# Patient Record
Sex: MALE | Race: WHITE | NOT HISPANIC OR LATINO | URBAN - METROPOLITAN AREA
[De-identification: names, ages, dates, MRNs, and addresses within clinical notes are randomized per-mention and may not be internally consistent; named-entity substitution may affect disease eponyms.]

---

## 2018-04-14 ENCOUNTER — INPATIENT (INPATIENT)
Facility: HOSPITAL | Age: 43
LOS: 3 days | Discharge: HOME | End: 2018-04-18
Attending: INTERNAL MEDICINE | Admitting: INTERNAL MEDICINE

## 2018-04-14 VITALS
HEART RATE: 63 BPM | DIASTOLIC BLOOD PRESSURE: 69 MMHG | SYSTOLIC BLOOD PRESSURE: 126 MMHG | OXYGEN SATURATION: 100 % | RESPIRATION RATE: 18 BRPM

## 2018-04-14 LAB
ALBUMIN SERPL ELPH-MCNC: 4.8 G/DL — SIGNIFICANT CHANGE UP (ref 3.5–5.2)
ALP SERPL-CCNC: 206 U/L — HIGH (ref 30–115)
ALT FLD-CCNC: 93 U/L — HIGH (ref 0–41)
ANION GAP SERPL CALC-SCNC: 28 MMOL/L — HIGH (ref 7–14)
APTT BLD: 33.7 SEC — SIGNIFICANT CHANGE UP (ref 27–39.2)
AST SERPL-CCNC: 98 U/L — HIGH (ref 0–41)
BASE EXCESS BLDA CALC-SCNC: -2.4 MMOL/L — LOW (ref -2–2)
BILIRUB DIRECT SERPL-MCNC: <0.2 MG/DL — SIGNIFICANT CHANGE UP (ref 0–0.2)
BILIRUB INDIRECT FLD-MCNC: >0.2 MG/DL — SIGNIFICANT CHANGE UP (ref 0.2–1.2)
BILIRUB SERPL-MCNC: 0.4 MG/DL — SIGNIFICANT CHANGE UP (ref 0.2–1.2)
BILIRUB SERPL-MCNC: 0.4 MG/DL — SIGNIFICANT CHANGE UP (ref 0.2–1.2)
BLD GP AB SCN SERPL QL: SIGNIFICANT CHANGE UP
BUN SERPL-MCNC: 14 MG/DL — SIGNIFICANT CHANGE UP (ref 10–20)
CALCIUM SERPL-MCNC: 10.9 MG/DL — HIGH (ref 8.5–10.1)
CHLORIDE SERPL-SCNC: 97 MMOL/L — LOW (ref 98–110)
CK SERPL-CCNC: 154 U/L — SIGNIFICANT CHANGE UP (ref 0–225)
CO2 SERPL-SCNC: 13 MMOL/L — LOW (ref 17–32)
CREAT SERPL-MCNC: 1.6 MG/DL — HIGH (ref 0.7–1.5)
GAS PNL BLDV: SIGNIFICANT CHANGE UP
GLUCOSE SERPL-MCNC: 100 MG/DL — HIGH (ref 70–99)
HCO3 BLDA-SCNC: 21 MMOL/L — LOW (ref 23–27)
HCT VFR BLD CALC: 37.7 % — LOW (ref 42–52)
HGB BLD-MCNC: 13 G/DL — LOW (ref 14–18)
INR BLD: 1.22 RATIO — SIGNIFICANT CHANGE UP (ref 0.65–1.3)
LACTATE SERPL-SCNC: 4.1 MMOL/L — CRITICAL HIGH (ref 0.5–2.2)
LIDOCAIN IGE QN: 129 U/L — HIGH (ref 7–60)
MCHC RBC-ENTMCNC: 30.6 PG — SIGNIFICANT CHANGE UP (ref 27–31)
MCHC RBC-ENTMCNC: 34.5 G/DL — SIGNIFICANT CHANGE UP (ref 32–37)
MCV RBC AUTO: 88.7 FL — SIGNIFICANT CHANGE UP (ref 80–94)
NRBC # BLD: 0 /100 WBCS — SIGNIFICANT CHANGE UP (ref 0–0)
PCO2 BLDA: 32 MMHG — LOW (ref 38–42)
PH BLDA: 7.43 — HIGH (ref 7.38–7.42)
PLATELET # BLD AUTO: 271 K/UL — SIGNIFICANT CHANGE UP (ref 130–400)
PO2 BLDA: 204 MMHG — HIGH (ref 78–95)
POTASSIUM SERPL-MCNC: 5.6 MMOL/L — HIGH (ref 3.5–5)
POTASSIUM SERPL-SCNC: 5.6 MMOL/L — HIGH (ref 3.5–5)
PROT SERPL-MCNC: 8.4 G/DL — HIGH (ref 6–8)
PROTHROM AB SERPL-ACNC: 13.2 SEC — HIGH (ref 9.95–12.87)
RBC # BLD: 4.25 M/UL — LOW (ref 4.7–6.1)
RBC # FLD: 12.4 % — SIGNIFICANT CHANGE UP (ref 11.5–14.5)
SAO2 % BLDA: 100 % — HIGH (ref 94–98)
SODIUM SERPL-SCNC: 138 MMOL/L — SIGNIFICANT CHANGE UP (ref 135–146)
TROPONIN T SERPL-MCNC: <0.01 NG/ML — SIGNIFICANT CHANGE UP
TYPE + AB SCN PNL BLD: SIGNIFICANT CHANGE UP
WBC # BLD: 17.21 K/UL — HIGH (ref 4.8–10.8)
WBC # FLD AUTO: 17.21 K/UL — HIGH (ref 4.8–10.8)

## 2018-04-14 RX ORDER — SODIUM CHLORIDE 9 MG/ML
1000 INJECTION INTRAMUSCULAR; INTRAVENOUS; SUBCUTANEOUS ONCE
Qty: 0 | Refills: 0 | Status: COMPLETED | OUTPATIENT
Start: 2018-04-14 | End: 2018-04-14

## 2018-04-14 RX ORDER — ETOMIDATE 2 MG/ML
20 INJECTION INTRAVENOUS ONCE
Qty: 0 | Refills: 0 | Status: COMPLETED | OUTPATIENT
Start: 2018-04-14 | End: 2018-04-14

## 2018-04-14 RX ORDER — ETOMIDATE 2 MG/ML
10 INJECTION INTRAVENOUS ONCE
Qty: 0 | Refills: 0 | Status: COMPLETED | OUTPATIENT
Start: 2018-04-14 | End: 2018-04-14

## 2018-04-14 RX ORDER — METOCLOPRAMIDE HCL 10 MG
10 TABLET ORAL ONCE
Qty: 0 | Refills: 0 | Status: COMPLETED | OUTPATIENT
Start: 2018-04-14 | End: 2018-04-14

## 2018-04-14 RX ORDER — SUCCINYLCHOLINE CHLORIDE 100 MG/5ML
100 SYRINGE (ML) INTRAVENOUS ONCE
Qty: 0 | Refills: 0 | Status: COMPLETED | OUTPATIENT
Start: 2018-04-14 | End: 2018-04-14

## 2018-04-14 RX ORDER — ATROPINE SULFATE 0.1 MG/ML
1 SYRINGE (ML) INJECTION ONCE
Qty: 0 | Refills: 0 | Status: COMPLETED | OUTPATIENT
Start: 2018-04-14 | End: 2018-04-14

## 2018-04-14 RX ADMIN — Medication 100 MILLIGRAM(S): at 19:25

## 2018-04-14 RX ADMIN — Medication 100 MILLIGRAM(S): at 19:40

## 2018-04-14 RX ADMIN — Medication 10 MILLIGRAM(S): at 19:06

## 2018-04-14 RX ADMIN — Medication 1 MILLIGRAM(S): at 19:30

## 2018-04-14 RX ADMIN — ETOMIDATE 20 MILLIGRAM(S): 2 INJECTION INTRAVENOUS at 19:25

## 2018-04-14 RX ADMIN — SODIUM CHLORIDE 1000 MILLILITER(S): 9 INJECTION INTRAMUSCULAR; INTRAVENOUS; SUBCUTANEOUS at 19:20

## 2018-04-14 RX ADMIN — ETOMIDATE 10 MILLIGRAM(S): 2 INJECTION INTRAVENOUS at 19:40

## 2018-04-14 NOTE — ED PROVIDER NOTE - OBJECTIVE STATEMENT
41 y/o M w/ pmh significant for TBI, chronic pain, anxiety, s/p abdominal surgery as an infant p/w n/v/d and AMS. Per friends at bedside pt was acting normally and playing video games when he began complaining of feeling cold, pt became diaphoretic and altered and began vomiting and having diarrhea. Pt takes lyrica, xanax, and percocet. Received narcan in the field with no improvement. Per friends pt has had similar reactions in past but never this severe. 41 y/o M w/ pmh significant for TBI, chronic pain, anxiety, s/p abdominal surgery as an infant p/w n/v/d and AMS. Per friends at bedside pt was acting normally and playing video games when he began complaining of feeling cold, pt became diaphoretic and altered and began vomiting and having diarrhea. Pt takes lyrica, xanax, and percocet. Received narcan in the field with no improvement. Per friends pt has had similar episodes in past but never this severe.

## 2018-04-14 NOTE — ED PROVIDER NOTE - ATTENDING CONTRIBUTION TO CARE
I personally evaluated the patient. I reviewed the Resident’s or Physician Assistant’s note (as assigned above), and agree with the findings and plan except as documented in my note.  pt presents with ams, n/v/d, and intermittent bradycardia. pt intubated for airway protection, and ams. ng placed. labs, ekg, cxr, ct head, ct ab and admit.

## 2018-04-14 NOTE — ED PROVIDER NOTE - PHYSICAL EXAMINATION
Minimally responsive, moaning to painful stimulus, ill appearing and in moderate distress. Vomiting intermittently but protecting airway. Skin  warm and dry, no acute rash. Head normocephalic, atraumatic. PERRLA, conjunctiva and sclera clear. MM moist, no nasal discharge.  Neck supple nt, no meningeal signs. Heart RRR s1s2 nl, no rub/murmur. Lungs- No retractions, BS equal, CTAB. Abdomen soft ntnd no r/g. Extremities- moves all weakly, +equal distal pulses, brisk cap refill. No LE edema.

## 2018-04-15 DIAGNOSIS — Z98.890 OTHER SPECIFIED POSTPROCEDURAL STATES: Chronic | ICD-10-CM

## 2018-04-15 LAB
ALBUMIN SERPL ELPH-MCNC: 4.5 G/DL — SIGNIFICANT CHANGE UP (ref 3.5–5.2)
ALP SERPL-CCNC: 176 U/L — HIGH (ref 30–115)
ALT FLD-CCNC: 69 U/L — HIGH (ref 0–41)
ANION GAP SERPL CALC-SCNC: 16 MMOL/L — HIGH (ref 7–14)
APAP SERPL-MCNC: <5 UG/ML — LOW (ref 10–30)
APPEARANCE UR: CLEAR — SIGNIFICANT CHANGE UP
APTT BLD: 38.4 SEC — SIGNIFICANT CHANGE UP (ref 27–39.2)
AST SERPL-CCNC: 43 U/L — HIGH (ref 0–41)
BASE EXCESS BLDA CALC-SCNC: 0.7 MMOL/L — SIGNIFICANT CHANGE UP (ref -2–2)
BASOPHILS # BLD AUTO: 0.03 K/UL — SIGNIFICANT CHANGE UP (ref 0–0.2)
BASOPHILS NFR BLD AUTO: 0.3 % — SIGNIFICANT CHANGE UP (ref 0–1)
BILIRUB DIRECT SERPL-MCNC: <0.2 MG/DL — SIGNIFICANT CHANGE UP (ref 0–0.2)
BILIRUB INDIRECT FLD-MCNC: >0.1 MG/DL — LOW (ref 0.2–1.2)
BILIRUB SERPL-MCNC: 0.3 MG/DL — SIGNIFICANT CHANGE UP (ref 0.2–1.2)
BILIRUB UR-MCNC: NEGATIVE — SIGNIFICANT CHANGE UP
BUN SERPL-MCNC: 12 MG/DL — SIGNIFICANT CHANGE UP (ref 10–20)
CALCIUM SERPL-MCNC: 9.3 MG/DL — SIGNIFICANT CHANGE UP (ref 8.5–10.1)
CHLORIDE SERPL-SCNC: 98 MMOL/L — SIGNIFICANT CHANGE UP (ref 98–110)
CK SERPL-CCNC: 133 U/L — SIGNIFICANT CHANGE UP (ref 0–225)
CO2 SERPL-SCNC: 25 MMOL/L — SIGNIFICANT CHANGE UP (ref 17–32)
COLOR SPEC: YELLOW — SIGNIFICANT CHANGE UP
COMMENT - URINE: SIGNIFICANT CHANGE UP
CREAT SERPL-MCNC: 1.2 MG/DL — SIGNIFICANT CHANGE UP (ref 0.7–1.5)
DIFF PNL FLD: NEGATIVE — SIGNIFICANT CHANGE UP
EOSINOPHIL # BLD AUTO: 0.34 K/UL — SIGNIFICANT CHANGE UP (ref 0–0.7)
EOSINOPHIL NFR BLD AUTO: 3 % — SIGNIFICANT CHANGE UP (ref 0–8)
EPI CELLS # UR: (no result) /HPF
FLU A RESULT: NEGATIVE — SIGNIFICANT CHANGE UP
FLU A RESULT: NEGATIVE — SIGNIFICANT CHANGE UP
FLUAV AG NPH QL: NEGATIVE — SIGNIFICANT CHANGE UP
FLUBV AG NPH QL: NEGATIVE — SIGNIFICANT CHANGE UP
GLUCOSE SERPL-MCNC: 99 MG/DL — SIGNIFICANT CHANGE UP (ref 70–99)
GLUCOSE UR QL: NEGATIVE — SIGNIFICANT CHANGE UP
HCO3 BLDA-SCNC: 24 MMOL/L — SIGNIFICANT CHANGE UP (ref 23–27)
HCT VFR BLD CALC: 40.7 % — LOW (ref 42–52)
HGB BLD-MCNC: 13.8 G/DL — LOW (ref 14–18)
IMM GRANULOCYTES NFR BLD AUTO: 0.4 % — HIGH (ref 0.1–0.3)
KETONES UR-MCNC: NEGATIVE — SIGNIFICANT CHANGE UP
LEUKOCYTE ESTERASE UR-ACNC: NEGATIVE — SIGNIFICANT CHANGE UP
LYMPHOCYTES # BLD AUTO: 18.7 % — LOW (ref 20.5–51.1)
LYMPHOCYTES # BLD AUTO: 2.11 K/UL — SIGNIFICANT CHANGE UP (ref 1.2–3.4)
MAGNESIUM SERPL-MCNC: 2 MG/DL — SIGNIFICANT CHANGE UP (ref 1.8–2.4)
MCHC RBC-ENTMCNC: 30.2 PG — SIGNIFICANT CHANGE UP (ref 27–31)
MCHC RBC-ENTMCNC: 33.9 G/DL — SIGNIFICANT CHANGE UP (ref 32–37)
MCV RBC AUTO: 89.1 FL — SIGNIFICANT CHANGE UP (ref 80–94)
MONOCYTES # BLD AUTO: 0.7 K/UL — HIGH (ref 0.1–0.6)
MONOCYTES NFR BLD AUTO: 6.2 % — SIGNIFICANT CHANGE UP (ref 1.7–9.3)
NEUTROPHILS # BLD AUTO: 8.04 K/UL — HIGH (ref 1.4–6.5)
NEUTROPHILS NFR BLD AUTO: 71.4 % — SIGNIFICANT CHANGE UP (ref 42.2–75.2)
NITRITE UR-MCNC: NEGATIVE — SIGNIFICANT CHANGE UP
NT-PROBNP SERPL-SCNC: 113 PG/ML — SIGNIFICANT CHANGE UP (ref 0–300)
OSMOLALITY SERPL: 284 MOS/KG — LOW (ref 289–308)
PCO2 BLDA: 32 MMHG — LOW (ref 38–42)
PH BLDA: 7.47 — HIGH (ref 7.38–7.42)
PH UR: 7 — SIGNIFICANT CHANGE UP (ref 5–8)
PLATELET # BLD AUTO: 285 K/UL — SIGNIFICANT CHANGE UP (ref 130–400)
PO2 BLDA: 239 MMHG — HIGH (ref 78–95)
POTASSIUM SERPL-MCNC: 4 MMOL/L — SIGNIFICANT CHANGE UP (ref 3.5–5)
POTASSIUM SERPL-SCNC: 4 MMOL/L — SIGNIFICANT CHANGE UP (ref 3.5–5)
PROT SERPL-MCNC: 6.8 G/DL — SIGNIFICANT CHANGE UP (ref 6–8)
PROT UR-MCNC: 30
RBC # BLD: 4.57 M/UL — LOW (ref 4.7–6.1)
RBC # FLD: 12.5 % — SIGNIFICANT CHANGE UP (ref 11.5–14.5)
SALICYLATES SERPL-MCNC: <0.3 MG/DL — LOW (ref 4–30)
SAO2 % BLDA: 100 % — HIGH (ref 94–98)
SODIUM SERPL-SCNC: 139 MMOL/L — SIGNIFICANT CHANGE UP (ref 135–146)
SP GR SPEC: >=1.03 — SIGNIFICANT CHANGE UP (ref 1.01–1.03)
UROBILINOGEN FLD QL: 0.2 — SIGNIFICANT CHANGE UP (ref 0.2–0.2)
WBC # BLD: 11.27 K/UL — HIGH (ref 4.8–10.8)
WBC # FLD AUTO: 11.27 K/UL — HIGH (ref 4.8–10.8)

## 2018-04-15 RX ORDER — FENTANYL CITRATE 50 UG/ML
1 INJECTION INTRAVENOUS
Qty: 2500 | Refills: 0 | Status: DISCONTINUED | OUTPATIENT
Start: 2018-04-15 | End: 2018-04-16

## 2018-04-15 RX ORDER — SODIUM CHLORIDE 9 MG/ML
1000 INJECTION, SOLUTION INTRAVENOUS
Qty: 0 | Refills: 0 | Status: DISCONTINUED | OUTPATIENT
Start: 2018-04-15 | End: 2018-04-17

## 2018-04-15 RX ORDER — SODIUM CHLORIDE 9 MG/ML
1000 INJECTION INTRAMUSCULAR; INTRAVENOUS; SUBCUTANEOUS
Qty: 0 | Refills: 0 | Status: DISCONTINUED | OUTPATIENT
Start: 2018-04-15 | End: 2018-04-15

## 2018-04-15 RX ORDER — ONDANSETRON 8 MG/1
4 TABLET, FILM COATED ORAL ONCE
Qty: 0 | Refills: 0 | Status: COMPLETED | OUTPATIENT
Start: 2018-04-15 | End: 2018-04-15

## 2018-04-15 RX ORDER — PANTOPRAZOLE SODIUM 20 MG/1
40 TABLET, DELAYED RELEASE ORAL
Qty: 0 | Refills: 0 | Status: DISCONTINUED | OUTPATIENT
Start: 2018-04-15 | End: 2018-04-17

## 2018-04-15 RX ORDER — HEPARIN SODIUM 5000 [USP'U]/ML
5000 INJECTION INTRAVENOUS; SUBCUTANEOUS EVERY 8 HOURS
Qty: 0 | Refills: 0 | Status: DISCONTINUED | OUTPATIENT
Start: 2018-04-15 | End: 2018-04-18

## 2018-04-15 RX ORDER — PROPOFOL 10 MG/ML
1 INJECTION, EMULSION INTRAVENOUS
Qty: 1000 | Refills: 0 | Status: DISCONTINUED | OUTPATIENT
Start: 2018-04-15 | End: 2018-04-16

## 2018-04-15 RX ORDER — ALPRAZOLAM 0.25 MG
0 TABLET ORAL
Qty: 0 | Refills: 0 | COMMUNITY

## 2018-04-15 RX ORDER — CHLORHEXIDINE GLUCONATE 213 G/1000ML
15 SOLUTION TOPICAL
Qty: 0 | Refills: 0 | Status: DISCONTINUED | OUTPATIENT
Start: 2018-04-15 | End: 2018-04-16

## 2018-04-15 RX ORDER — MIDAZOLAM HYDROCHLORIDE 1 MG/ML
0.3 INJECTION, SOLUTION INTRAMUSCULAR; INTRAVENOUS
Qty: 100 | Refills: 0 | Status: DISCONTINUED | OUTPATIENT
Start: 2018-04-15 | End: 2018-04-16

## 2018-04-15 RX ORDER — PIPERACILLIN AND TAZOBACTAM 4; .5 G/20ML; G/20ML
3.38 INJECTION, POWDER, LYOPHILIZED, FOR SOLUTION INTRAVENOUS EVERY 8 HOURS
Qty: 0 | Refills: 0 | Status: DISCONTINUED | OUTPATIENT
Start: 2018-04-15 | End: 2018-04-15

## 2018-04-15 RX ADMIN — CHLORHEXIDINE GLUCONATE 15 MILLILITER(S): 213 SOLUTION TOPICAL at 05:40

## 2018-04-15 RX ADMIN — MIDAZOLAM HYDROCHLORIDE 0.3 MG/HR: 1 INJECTION, SOLUTION INTRAMUSCULAR; INTRAVENOUS at 08:01

## 2018-04-15 RX ADMIN — MIDAZOLAM HYDROCHLORIDE 0.3 MG/HR: 1 INJECTION, SOLUTION INTRAMUSCULAR; INTRAVENOUS at 00:33

## 2018-04-15 RX ADMIN — PROPOFOL 0.45 MICROGRAM(S)/KG/MIN: 10 INJECTION, EMULSION INTRAVENOUS at 00:33

## 2018-04-15 RX ADMIN — PIPERACILLIN AND TAZOBACTAM 25 GRAM(S): 4; .5 INJECTION, POWDER, LYOPHILIZED, FOR SOLUTION INTRAVENOUS at 05:40

## 2018-04-15 RX ADMIN — ONDANSETRON 4 MILLIGRAM(S): 8 TABLET, FILM COATED ORAL at 23:10

## 2018-04-15 RX ADMIN — HEPARIN SODIUM 5000 UNIT(S): 5000 INJECTION INTRAVENOUS; SUBCUTANEOUS at 21:10

## 2018-04-15 RX ADMIN — PROPOFOL 0.45 MICROGRAM(S)/KG/MIN: 10 INJECTION, EMULSION INTRAVENOUS at 08:08

## 2018-04-15 RX ADMIN — HEPARIN SODIUM 5000 UNIT(S): 5000 INJECTION INTRAVENOUS; SUBCUTANEOUS at 05:40

## 2018-04-15 RX ADMIN — HEPARIN SODIUM 5000 UNIT(S): 5000 INJECTION INTRAVENOUS; SUBCUTANEOUS at 13:08

## 2018-04-15 RX ADMIN — FENTANYL CITRATE 7.5 MICROGRAM(S)/KG/HR: 50 INJECTION INTRAVENOUS at 08:00

## 2018-04-15 RX ADMIN — FENTANYL CITRATE 7.5 MICROGRAM(S)/KG/HR: 50 INJECTION INTRAVENOUS at 00:32

## 2018-04-15 RX ADMIN — SODIUM CHLORIDE 100 MILLILITER(S): 9 INJECTION, SOLUTION INTRAVENOUS at 11:54

## 2018-04-15 RX ADMIN — SODIUM CHLORIDE 100 MILLILITER(S): 9 INJECTION INTRAMUSCULAR; INTRAVENOUS; SUBCUTANEOUS at 07:58

## 2018-04-15 NOTE — CONSULT NOTE ADULT - SUBJECTIVE AND OBJECTIVE BOX
Patient is a 42y old  Male who presents with a chief complaint of confusion (15 Apr 2018 02:09)      HPI:  42 year old man with history of traumatic brain injury, anxiety, chronic pain brought by ems after his friends called because of his confusion. He was apparently playing video games at his friends house when all of a sudden he started having chills and vomiting and having stool incontinence on himself and then he became confused. His friends denied drug ingestion. He received narcan on the field but he was still confused. The patient was confused upon arrival to the ED. He was still vomiting in the ED. He was agitated so they had to perform endotracheal intubation. (15 Apr 2018 02:09)      PAST MEDICAL & SURGICAL HISTORY:  Anxiety  Chronic pain syndrome  Traumatic brain injury, without loss of consciousness, subsequent encounter  H/O abdominal surgery      SOCIAL HX:   Smoking      UTO                  ETOH                            Other    FAMILY HISTORY:  No pertinent family history in first degree relatives      ROS:  See HPI     Allergies    Allergy Status Unknown    Intolerances          PHYSICAL EXAM    ICU Vital Signs Last 24 Hrs  T(C): 38 (15 Apr 2018 08:00), Max: 38 (15 Apr 2018 08:00)  T(F): 100.4 (15 Apr 2018 08:00), Max: 100.4 (15 Apr 2018 08:00)  HR: 58 (15 Apr 2018 08:00) (50 - 98)  BP: 158/74 (15 Apr 2018 08:00) (100/81 - 194/111)  BP(mean): 104 (15 Apr 2018 08:00) (90 - 112)  ABP: --  ABP(mean): --  RR: 26 (15 Apr 2018 08:00) (18 - 45)  SpO2: 100% (15 Apr 2018 08:00) (96% - 100%)      General: Sedation vacation.  Follows commands  HEENT:  + ET              Lymph Nodes: No cervical LN   Lungs: Bilateral BS  Cardiovascular: Regular  Abdomen: Soft, Positive BS  Extremities: No clubbing  Skin: Warm  Neurological: Non focal       04-14-18 @ 07:01  -  04-15-18 @ 07:00  --------------------------------------------------------  IN:    fentaNYL  Infusion: 239.4 mL    midazolam Infusion: 40 mL    propofol Infusion: 118.7 mL    sodium chloride 0.9%.: 400 mL  Total IN: 798 mL    OUT:    Indwelling Catheter - Urethral: 455 mL    Nasoenteral Tube: 100 mL  Total OUT: 555 mL    Total NET: 243 mL      04-15-18 @ 07:01  -  04-15-18 @ 08:51  --------------------------------------------------------  IN:    fentaNYL  Infusion: 41.6 mL    midazolam Infusion: 4 mL    sodium chloride 0.9%.: 100 mL  Total IN: 145.6 mL    OUT:    Indwelling Catheter - Urethral: 75 mL  Total OUT: 75 mL    Total NET: 70.6 mL          LABS:                          13.8   11.27 )-----------( 285      ( 15 Apr 2018 04:50 )             40.7                                               04-15    139  |  98  |  12  ----------------------------<  99  4.0   |  25  |  1.2    Ca    9.3      15 Apr 2018 04:50  Mg     2.0     04-15    TPro  6.8  /  Alb  4.5  /  TBili  0.3  /  DBili  <0.2  /  AST  43<H>  /  ALT  69<H>  /  AlkPhos  176<H>  04-15      PT/INR - ( 14 Apr 2018 18:58 )   PT: 13.20 sec;   INR: 1.22 ratio         PTT - ( 15 Apr 2018 04:50 )  PTT:38.4 sec                                       Urinalysis Basic - ( 14 Apr 2018 23:50 )    Color: Yellow / Appearance: Clear / SG: >=1.030 / pH: x  Gluc: x / Ketone: Negative  / Bili: Negative / Urobili: 0.2   Blood: x / Protein: 30 / Nitrite: Negative   Leuk Esterase: Negative / RBC: x / WBC x   Sq Epi: x / Non Sq Epi: Few /HPF / Bacteria: x        CARDIAC MARKERS ( 15 Apr 2018 04:50 )  x     / x     / 133 U/L / x     / x      CARDIAC MARKERS ( 14 Apr 2018 18:58 )  x     / <0.01 ng/mL / 154 U/L / x     / x                                                LIVER FUNCTIONS - ( 15 Apr 2018 04:50 )  Alb: 4.5 g/dL / Pro: 6.8 g/dL / ALK PHOS: 176 U/L / ALT: 69 U/L / AST: 43 U/L / GGT: x                                                                                               Mode: AC/ CMV (Assist Control/ Continuous Mandatory Ventilation)  RR (machine): 16  TV (machine): 450  FiO2: 60  PEEP: 5  ITime: 1  MAP: 13  PIP: 27                                      ABG - ( 15 Apr 2018 07:22 )  pH: 7.47  /  pCO2: 32    /  pO2: 239   / HCO3: 24    / Base Excess: 0.7   /  SaO2: 100                 X-Rays        ET, OG OK.  No infiltrate                                                                             ECHO    MEDICATIONS  (STANDING):  chlorhexidine 0.12% Liquid 15 milliLiter(s) Swish and Spit two times a day  fentaNYL   Infusion 1 MICROgram(s)/kG/Hr (7.5 mL/Hr) IV Continuous <Continuous>  heparin  Injectable 5000 Unit(s) SubCutaneous every 8 hours  midazolam Infusion 0.3 mG/Hr (0.3 mL/Hr) IV Continuous <Continuous>  pantoprazole   Suspension 40 milliGRAM(s) Oral before breakfast  piperacillin/tazobactam IVPB. 3.375 Gram(s) IV Intermittent every 8 hours  propofol Infusion 1 MICROgram(s)/kG/Min (0.45 mL/Hr) IV Continuous <Continuous>  sodium chloride 0.9%. 1000 milliLiter(s) (100 mL/Hr) IV Continuous <Continuous>    MEDICATIONS  (PRN):

## 2018-04-15 NOTE — H&P ADULT - ASSESSMENT
42 year old man with history of TBI , chronic pain on percocet , anxiety brought by EMS for vomiting , diarrhea and confusion . Intubated for airway protection. High likelihood of aspiration. CT head negative for acute intracranial pathology. CT abdomen consistent with enteritis.     #Acute confusion : could be secondary to viral enteritis versus toxin ingestion. Intubated for airway protection. Tylenol, salicylate levels are within normal.   Toxicology consulted follow up official recs  Drug screen  Consider lumbar puncture   EEG   consider neurology consult     #Transaminitis : could be due to acute viral infection versus transient hypotension or toxin ingestion . CT abdomen negative for acute intrahepatic process.   Will monitor trend of liver enzymes   viral hepatitis serologies     #Diarrhea. vomiting : CT showing enteritis with liquid stools . Could be withdrawing from toxin versus infectious   Sent blood cultures and urine analysis.   Zocyn    #Intubated: for airway protection . There is a chance he may have aspirated given that he was vomiting prior to intubation.   Follow xrays   HOB elevation to 45  Sedation with midazolam and fentanyl to reach RAAS -2     #MARISA likely from dehydration . No indication for RRT   Get urine for FENA   Accurate in and out   NS at 100 cc/hr     #Dispo to be determined   Full code   DVT prophylaxis   GI prophylaxis

## 2018-04-15 NOTE — H&P ADULT - HISTORY OF PRESENT ILLNESS
42 year old man with history of traumatic brain injury, anxiety, chronic pain brought by ems after his friends called because of his confusion. He was apparently playing video games at his friends house when all of a sudden he started having chills and vomiting and having stool incontinence on himself and then he became confused. His friends denied drug ingestion. He received narcan on the field but he was still confused. The patient was confused upon arrival to the ED. He was still vomiting in the ED. He was agitated so they had to perform endotracheal intubation.

## 2018-04-15 NOTE — ED ADULT TRIAGE NOTE - CHIEF COMPLAINT QUOTE
Pt was brought by EMS for altered mental status, vomiting and syncope at home. Narcan was given by EMS prior to arrival without improvement.

## 2018-04-15 NOTE — H&P ADULT - NSHPPHYSICALEXAM_GEN_ALL_CORE
VITAL SIGNS: I have reviewed nursing notes and confirm.  CONSTITUTIONAL: Well-developed; well-nourished; intubated   SKIN: Skin exam is warm and dry, no acute rash.  HEAD: Normocephalic; atraumatic. Excessive oral secretions   EYES: PERRL, non icteric   NECK: Supple; non tender.  No lymphadenopathy.  CARD: S1, S2 normal; no murmurs, gallops, or rubs. Regular rate and rhythm.  RESP: No wheezes, rales or rhonchi.  ABD: Normal bowel sounds; soft; non-distended; no hepatosplenomegaly.  EXT: Normal ROM. No clubbing, cyanosis or edema.  NEURO: sedated

## 2018-04-15 NOTE — H&P ADULT - NSHPLABSRESULTS_GEN_ALL_CORE
13.0   17.21 )-----------( 271      ( 14 Apr 2018 21:00 )             37.7     04-14    138  |  97<L>  |  14  ----------------------------<  100<H>  5.6<H>   |  13<L>  |  1.6<H>    Ca    10.9<H>      14 Apr 2018 18:58    TPro  8.4<H>  /  Alb  4.8  /  TBili  0.4  /  DBili  <0.2  /  AST  98<H>  /  ALT  93<H>  /  AlkPhos  206<H>  04-14          ABG - ( 14 Apr 2018 21:39 )  pH: 7.43  /  pCO2: 32    /  pO2: 204   / HCO3: 21    / Base Excess: -2.4  /  SaO2: 100       Urinalysis Basic - ( 14 Apr 2018 23:50 )    Color: Yellow / Appearance: Clear / SG: >=1.030 / pH: x  Gluc: x / Ketone: Negative  / Bili: Negative / Urobili: 0.2   Blood: x / Protein: 30 / Nitrite: Negative   Leuk Esterase: Negative / RBC: x / WBC x   Sq Epi: x / Non Sq Epi: Few /HPF / Bacteria: x        PT/INR - ( 14 Apr 2018 18:58 )   PT: 13.20 sec;   INR: 1.22 ratio         PTT - ( 14 Apr 2018 18:58 )  PTT:33.7 sec    Lactate Trend  04-14 @ 18:58 Lactate:4.1   Repeat lactic 0.8      CARDIAC MARKERS ( 14 Apr 2018 18:58 )  x     / <0.01 ng/mL / 154 U/L / x     / x

## 2018-04-15 NOTE — H&P ADULT - PMH
Anxiety    Chronic pain syndrome    Traumatic brain injury, without loss of consciousness, subsequent encounter

## 2018-04-16 LAB
ALBUMIN SERPL ELPH-MCNC: 4.3 G/DL — SIGNIFICANT CHANGE UP (ref 3.5–5.2)
ALP SERPL-CCNC: 157 U/L — HIGH (ref 30–115)
ALT FLD-CCNC: 45 U/L — HIGH (ref 0–41)
ANION GAP SERPL CALC-SCNC: 21 MMOL/L — HIGH (ref 7–14)
APTT BLD: 34.5 SEC — SIGNIFICANT CHANGE UP (ref 27–39.2)
AST SERPL-CCNC: 22 U/L — SIGNIFICANT CHANGE UP (ref 0–41)
BASOPHILS # BLD AUTO: 0.01 K/UL — SIGNIFICANT CHANGE UP (ref 0–0.2)
BASOPHILS NFR BLD AUTO: 0.1 % — SIGNIFICANT CHANGE UP (ref 0–1)
BILIRUB SERPL-MCNC: 0.4 MG/DL — SIGNIFICANT CHANGE UP (ref 0.2–1.2)
BUN SERPL-MCNC: 12 MG/DL — SIGNIFICANT CHANGE UP (ref 10–20)
CALCIUM SERPL-MCNC: 9 MG/DL — SIGNIFICANT CHANGE UP (ref 8.5–10.1)
CHLORIDE SERPL-SCNC: 103 MMOL/L — SIGNIFICANT CHANGE UP (ref 98–110)
CHLORIDE UR-SCNC: 20 — SIGNIFICANT CHANGE UP
CO2 SERPL-SCNC: 24 MMOL/L — SIGNIFICANT CHANGE UP (ref 17–32)
CREAT ?TM UR-MCNC: 94 MG/DL — SIGNIFICANT CHANGE UP
CREAT SERPL-MCNC: 1 MG/DL — SIGNIFICANT CHANGE UP (ref 0.7–1.5)
CULTURE RESULTS: NO GROWTH — SIGNIFICANT CHANGE UP
DRUG SCREEN 1, URINE RESULT: SIGNIFICANT CHANGE UP
EOSINOPHIL # BLD AUTO: 0 K/UL — SIGNIFICANT CHANGE UP (ref 0–0.7)
EOSINOPHIL NFR BLD AUTO: 0 % — SIGNIFICANT CHANGE UP (ref 0–8)
GLUCOSE SERPL-MCNC: 118 MG/DL — HIGH (ref 70–99)
HCT VFR BLD CALC: 38.3 % — LOW (ref 42–52)
HGB BLD-MCNC: 13.2 G/DL — LOW (ref 14–18)
IMM GRANULOCYTES NFR BLD AUTO: 0.4 % — HIGH (ref 0.1–0.3)
INR BLD: 1.13 RATIO — SIGNIFICANT CHANGE UP (ref 0.65–1.3)
LYMPHOCYTES # BLD AUTO: 1.63 K/UL — SIGNIFICANT CHANGE UP (ref 1.2–3.4)
LYMPHOCYTES # BLD AUTO: 13.1 % — LOW (ref 20.5–51.1)
MAGNESIUM SERPL-MCNC: 1.8 MG/DL — SIGNIFICANT CHANGE UP (ref 1.8–2.4)
MCHC RBC-ENTMCNC: 30.4 PG — SIGNIFICANT CHANGE UP (ref 27–31)
MCHC RBC-ENTMCNC: 34.5 G/DL — SIGNIFICANT CHANGE UP (ref 32–37)
MCV RBC AUTO: 88.2 FL — SIGNIFICANT CHANGE UP (ref 80–94)
MONOCYTES # BLD AUTO: 0.71 K/UL — HIGH (ref 0.1–0.6)
MONOCYTES NFR BLD AUTO: 5.7 % — SIGNIFICANT CHANGE UP (ref 1.7–9.3)
NEUTROPHILS # BLD AUTO: 10.05 K/UL — HIGH (ref 1.4–6.5)
NEUTROPHILS NFR BLD AUTO: 80.7 % — HIGH (ref 42.2–75.2)
PLATELET # BLD AUTO: 301 K/UL — SIGNIFICANT CHANGE UP (ref 130–400)
POTASSIUM SERPL-MCNC: 3.6 MMOL/L — SIGNIFICANT CHANGE UP (ref 3.5–5)
POTASSIUM SERPL-SCNC: 3.6 MMOL/L — SIGNIFICANT CHANGE UP (ref 3.5–5)
PROT SERPL-MCNC: 6.7 G/DL — SIGNIFICANT CHANGE UP (ref 6–8)
PROTHROM AB SERPL-ACNC: 12.2 SEC — SIGNIFICANT CHANGE UP (ref 9.95–12.87)
RBC # BLD: 4.34 M/UL — LOW (ref 4.7–6.1)
RBC # FLD: 12.5 % — SIGNIFICANT CHANGE UP (ref 11.5–14.5)
SODIUM SERPL-SCNC: 148 MMOL/L — HIGH (ref 135–146)
SODIUM UR-SCNC: <20 MMOL/L — SIGNIFICANT CHANGE UP
SPECIMEN SOURCE: SIGNIFICANT CHANGE UP
WBC # BLD: 12.45 K/UL — HIGH (ref 4.8–10.8)
WBC # FLD AUTO: 12.45 K/UL — HIGH (ref 4.8–10.8)

## 2018-04-16 RX ORDER — ALPRAZOLAM 0.25 MG
1 TABLET ORAL
Qty: 0 | Refills: 0 | Status: DISCONTINUED | OUTPATIENT
Start: 2018-04-16 | End: 2018-04-18

## 2018-04-16 RX ORDER — ALPRAZOLAM 0.25 MG
1 TABLET ORAL ONCE
Qty: 0 | Refills: 0 | Status: DISCONTINUED | OUTPATIENT
Start: 2018-04-16 | End: 2018-04-16

## 2018-04-16 RX ADMIN — Medication 1 MILLIGRAM(S): at 22:16

## 2018-04-16 RX ADMIN — Medication 1 MILLIGRAM(S): at 12:31

## 2018-04-16 RX ADMIN — SODIUM CHLORIDE 100 MILLILITER(S): 9 INJECTION, SOLUTION INTRAVENOUS at 07:00

## 2018-04-16 NOTE — PROGRESS NOTE ADULT - ASSESSMENT
SYSTEM BASED PLAN:    Neurologic:       xanax for anxiety, home med    Cardiovascular:       no active issues    Pulmonary:      no active issues    Gastrointestinal:       Nutrition- regular diet       gi ppx     Genitourinary/Renal:       Goal fluid balance- i = o       IV fluids- LR       Electrolytes- monitor and replete prn    Infectious Disease:       no active issues    Hematology/Oncology: dvt ppx    Endocrine:       no active issues    Musculoskeletal:       Activity- OOB to chair       Skin/decub ulcers-  none    Indwelling vascular catheters: none    Urinary Catheter: dc her    Disposition: stable, dg to floors    Code Status: full

## 2018-04-16 NOTE — PROGRESS NOTE ADULT - ASSESSMENT
IMPRESSION:    AHRF secondary to AMS: Resolved  ? OD    PLAN:    CNS:     HEENT: Oral care    PULMONARY:  HOB @ 45 degrees.      CARDIOVASCULAR: keep I=O    GI: GI prophylaxis.  Feeding as tolerated    RENAL:  Follow up lytes.  Correct as needed. Follow up urine drug screen    INFECTIOUS DISEASE: Follow up cultures.  Hold ABX for now    HEMATOLOGICAL:  DVT prophylaxis.    ENDOCRINE:  Follow up FS.     MUSCULOSKELETAL: Out of bed to chair    Transfer to floor IMPRESSION:    AHRF secondary to AMS: Resolved  ? OD    PLAN:    CNS: Follow up Ct head    HEENT: Oral care    PULMONARY:  HOB @ 45 degrees.      CARDIOVASCULAR: keep I=O    GI: GI prophylaxis.  Feeding as tolerated    RENAL:  Follow up lytes.  Correct as needed. Follow up urine drug screen    INFECTIOUS DISEASE: Follow up cultures.  Hold ABX for now    HEMATOLOGICAL:  DVT prophylaxis.    ENDOCRINE:  Follow up FS.     MUSCULOSKELETAL: Out of bed to chair    Transfer to floor IMPRESSION:    AHRF secondary to AMS: Resolved  ? OD    PLAN:    CNS: Follow up CT head result    HEENT: Oral care    PULMONARY:  HOB @ 45 degrees.      CARDIOVASCULAR: keep I=O    GI: GI prophylaxis.  Feeding as tolerated    RENAL:  Follow up lytes.  Correct as needed. Follow up urine drug screen    INFECTIOUS DISEASE: Follow up cultures.  Hold ABX for now    HEMATOLOGICAL:  DVT prophylaxis.    ENDOCRINE:  Follow up FS.     MUSCULOSKELETAL: Out of bed to chair    Transfer to floor

## 2018-04-16 NOTE — CHART NOTE - NSCHARTNOTEFT_GEN_A_CORE
ICU DOWNGRADE NOTE:    42y Male transferred to floor from ICU    Patient is a 42y old Male who presents with a chief complaint of confusion (15 Apr 2018 02:09)    The patient is currently admitted for the primary diagnosis of Altered mental status    The patient was admitted to the unit for 2 Days.    The patient was intubated for 1 day and was never on pressors.    Indwelling vascular catheters: none    Urinary Catheter: her d/ava, f/u tov    Disposition: stable    Code Status: full    ICU COURSE OF EVENTS: 42 year old man with history of TBI , chronic pain on percocet , anxiety brought by EMS for vomiting , diarrhea and confusion, CTH neg,  intubated for airway protection and admitted to the ICU for monitoring. He was found to have an elevated AG , UDS positive for opiates and benzos. Pt was extubated on ICU day 2. At that time he admitted to ingesting heroin earlier that day. He was also found to have enteritis on CT abdomen. He was stable post-extubation. He was requesting detox / rehab for his drug addiction.   -------------------------------------------------------------------------------------------        -------------------------------------------------------------------------------------------    Current workup in progress: f/u bmp, f/u TOV, f/u detox    SIGN OUT AT 04-16-18 @ 15:19 GIVEN TO: ICU DOWNGRADE NOTE:    42y Male transferred to floor from ICU    Patient is a 42y old Male who presents with a chief complaint of confusion (15 Apr 2018 02:09)    The patient is currently admitted for the primary diagnosis of Altered mental status    The patient was admitted to the unit for 2 Days.    The patient was intubated for 1 day and was never on pressors.    Indwelling vascular catheters: none    Urinary Catheter: none    Disposition: stable    Code Status: full    ICU COURSE OF EVENTS: 42 year old man with history of TBI , chronic pain on percocet , anxiety brought by EMS for vomiting , diarrhea and confusion, CTH neg,  intubated for airway protection and admitted to the ICU for monitoring. He was found to have an elevated AG , UDS positive for opiates and benzos. Pt was extubated on ICU day 2. At that time he admitted to ingesting heroin earlier that day. He was also found to have enteritis on CT abdomen, suspected viral and treated symptomatically, improved. He was stable post-extubation. He was requesting detox / rehab for his drug addiction.   -------------------------------------------------------------------------------------------        -------------------------------------------------------------------------------------------    Current workup in progress: f/u labs and addiction medicine c/s    SIGN OUT AT 04-16-18 @ 15:19 GIVEN TO:

## 2018-04-16 NOTE — PROGRESS NOTE ADULT - SUBJECTIVE AND OBJECTIVE BOX
SUBJECTIVE: Patinet examined at bedside. No new complaints. SP extubation @ 2:00 pm      REVIEW OF SYSTEMS:  See HPI    PHYSICAL EXAM  Vital Signs Last 24 Hrs  T(C): 36.7 (16 Apr 2018 04:00), Max: 38.1 (15 Apr 2018 12:00)  T(F): 98.1 (16 Apr 2018 04:00), Max: 100.5 (15 Apr 2018 12:00)  HR: 58 (16 Apr 2018 07:00) (42 - 78)  BP: 149/78 (16 Apr 2018 05:30) (108/41 - 164/80)  BP(mean): 97 (16 Apr 2018 05:30) (63 - 119)  RR: 31 (16 Apr 2018 07:00) (16 - 42)  SpO2: 95% (16 Apr 2018 07:00) (93% - 100%)    General: AAO x 3  HEENT: NAD          Lymph Nodes: NO lymphadenopathy  Neck:  Supple  Lungs: Clear bilaterally  Cardiovascular: S1S2  Abdomen: Soft, non tender  Extremities: NO edema or cyanosis  Skin: Intact  neuro: non focal          LABS:                          13.2   12.45 )-----------( 301      ( 16 Apr 2018 04:26 )             38.3                                               04-16    148<H>  |  103  |  12  ----------------------------<  118<H>  3.6   |  24  |  1.0    Ca    9.0      16 Apr 2018 04:26  Mg     1.8     04-16    TPro  6.7  /  Alb  4.3  /  TBili  0.4  /  DBili  x   /  AST  22  /  ALT  45<H>  /  AlkPhos  157<H>  04-16      PT/INR - ( 16 Apr 2018 04:26 )   PT: 12.20 sec;   INR: 1.13 ratio         PTT - ( 16 Apr 2018 04:26 )  PTT:34.5 sec                                       Urinalysis Basic - ( 14 Apr 2018 23:50 )    Color: Yellow / Appearance: Clear / SG: >=1.030 / pH: x  Gluc: x / Ketone: Negative  / Bili: Negative / Urobili: 0.2   Blood: x / Protein: 30 / Nitrite: Negative   Leuk Esterase: Negative / RBC: x / WBC x   Sq Epi: x / Non Sq Epi: Few /HPF / Bacteria: x        CARDIAC MARKERS ( 15 Apr 2018 04:50 )  x     / x     / 133 U/L / x     / x      CARDIAC MARKERS ( 14 Apr 2018 18:58 )  x     / <0.01 ng/mL / 154 U/L / x     / x                                                LIVER FUNCTIONS - ( 16 Apr 2018 04:26 )  Alb: 4.3 g/dL / Pro: 6.7 g/dL / ALK PHOS: 157 U/L / ALT: 45 U/L / AST: 22 U/L / GGT: x                                                  Culture - Blood (collected 14 Apr 2018 20:45)  Source: .Blood Blood  Preliminary Report (16 Apr 2018 02:20):    No growth to date.                                                ABG - ( 15 Apr 2018 14:49 )  pH: 7.55  /  pCO2: 29    /  pO2: 116   / HCO3: 25    / Base Excess: 3.6   /  SaO2: 100                 MEDICATIONS  (STANDING):  chlorhexidine 0.12% Liquid 15 milliLiter(s) Swish and Spit two times a day  fentaNYL   Infusion 1 MICROgram(s)/kG/Hr (7.5 mL/Hr) IV Continuous <Continuous>  heparin  Injectable 5000 Unit(s) SubCutaneous every 8 hours  lactated ringers. 1000 milliLiter(s) (100 mL/Hr) IV Continuous <Continuous>  midazolam Infusion 0.3 mG/Hr (0.3 mL/Hr) IV Continuous <Continuous>  pantoprazole   Suspension 40 milliGRAM(s) Oral before breakfast  propofol Infusion 1 MICROgram(s)/kG/Min (0.45 mL/Hr) IV Continuous <Continuous>    MEDICATIONS  (PRN):

## 2018-04-16 NOTE — PROGRESS NOTE ADULT - SUBJECTIVE AND OBJECTIVE BOX
Patient is a 42y old Male who presents with a chief complaint of confusion (15 Apr 2018 02:09)    Currently admitted to medicine with the primary diagnosis of Altered mental status    Today is hospital day 1d.    BRIEF HOSPITAL COURSE: 42 year old man with history of TBI , chronic pain on percocet , anxiety brought by EMS for vomiting , diarrhea and confusion, CTH neg,  intubated for airway protection and admitted to the ICU for monitoring. He was found to have an elevated AG , UDS positive for opiates and benzos. Pt was extubated on ICU day 2. At that time he admitted to ingesting heroin earlier that day. He was also found to have enteritis on CT abdomen.    EVENTS LAST 24HRS: extubated yesterday, remains stable, downgrade to floors    PAST MEDICAL & SURGICAL HISTORY  Anxiety  Chronic pain syndrome  Traumatic brain injury, without loss of consciousness, subsequent encounter  H/O abdominal surgery      SOCIAL HISTORY:  + heroin  + etoh  - smoking    REVIEW OF SYSTEMS:  neg except as documented    MEDICATIONS:  STANDING MEDICATIONS  heparin  Injectable 5000 Unit(s) SubCutaneous every 8 hours  lactated ringers. 1000 milliLiter(s) IV Continuous <Continuous>  pantoprazole   Suspension 40 milliGRAM(s) Oral before breakfast    PRN MEDICATIONS    VITALS:   ICU Vital Signs Last 24 Hrs  T(C): 37.4 (16 Apr 2018 09:04), Max: 37.4 (16 Apr 2018 09:04)  T(F): 99.3 (16 Apr 2018 09:04), Max: 99.3 (16 Apr 2018 09:04)  HR: 62 (16 Apr 2018 09:04) (42 - 78)  BP: 132/66 (16 Apr 2018 09:04) (108/41 - 164/80)  BP(mean): 93 (16 Apr 2018 09:04) (63 - 119)  ABP: --  ABP(mean): --  RR: 25 (16 Apr 2018 09:04) (16 - 42)  SpO2: 97% (16 Apr 2018 09:04) (93% - 100%)      ABG - ( 15 Apr 2018 14:49 )  pH: 7.55  /  pCO2: 29    /  pO2: 116   / HCO3: 25    / Base Excess: 3.6   /  SaO2: 100         I&O's Detail    15 Apr 2018 07:01  -  16 Apr 2018 07:00  --------------------------------------------------------  IN:    fentaNYL  Infusion: 104.1 mL    lactated ringers.: 1700 mL    midazolam Infusion: 10 mL    propofol Infusion: 87.4 mL    sodium chloride 0.9%: 200 mL  Total IN: 2101.5 mL    OUT:    Emesis: 200 mL    Indwelling Catheter - Urethral: 1145 mL  Total OUT: 1345 mL    Total NET: 756.5 mL      16 Apr 2018 07:01  -  16 Apr 2018 13:42  --------------------------------------------------------  IN:  Total IN: 0 mL    OUT:    Voided: 400 mL  Total OUT: 400 mL    Total NET: -400 mL          LABS:                        13.2   12.45 )-----------( 301      ( 16 Apr 2018 04:26 )             38.3     04-16    148<H>  |  103  |  12  ----------------------------<  118<H>  3.6   |  24  |  1.0    Ca    9.0      16 Apr 2018 04:26  Mg     1.8     04-16    TPro  6.7  /  Alb  4.3  /  TBili  0.4  /  DBili  x   /  AST  22  /  ALT  45<H>  /  AlkPhos  157<H>  04-16      CARDIAC MARKERS ( 15 Apr 2018 04:50 )  x     / x     / 133 U/L / x     / x      CARDIAC MARKERS ( 14 Apr 2018 18:58 )  x     / <0.01 ng/mL / 154 U/L / x     / x          CAPILLARY BLOOD GLUCOSE        PT/INR - ( 16 Apr 2018 04:26 )   PT: 12.20 sec;   INR: 1.13 ratio         PTT - ( 16 Apr 2018 04:26 )  PTT:34.5 sec  Urinalysis Basic - ( 14 Apr 2018 23:50 )    Color: Yellow / Appearance: Clear / SG: >=1.030 / pH: x  Gluc: x / Ketone: Negative  / Bili: Negative / Urobili: 0.2   Blood: x / Protein: 30 / Nitrite: Negative   Leuk Esterase: Negative / RBC: x / WBC x   Sq Epi: x / Non Sq Epi: Few /HPF / Bacteria: x      CULTURES:  Culture Results:   No growth to date. (04-14 @ 20:45)      PHYSICAL EXAM:    General: No acute distress.  Alert, oriented, interactive, nonfocal    HEENT: Pupils equal, reactive to light.  Symmetric.    PULM: Clear to auscultation bilaterally, no significant sputum production    CVS: Regular rate and rhythm, no murmurs, rubs, or gallops    ABD: Soft, nondistended, nontender, normoactive bowel sounds, no masses    EXT: No edema, nontender    SKIN: Warm and well perfused, no rashes noted.

## 2018-04-17 LAB
ANION GAP SERPL CALC-SCNC: 17 MMOL/L — HIGH (ref 7–14)
BUN SERPL-MCNC: 10 MG/DL — SIGNIFICANT CHANGE UP (ref 10–20)
CALCIUM SERPL-MCNC: 9.1 MG/DL — SIGNIFICANT CHANGE UP (ref 8.5–10.1)
CHLORIDE SERPL-SCNC: 102 MMOL/L — SIGNIFICANT CHANGE UP (ref 98–110)
CO2 SERPL-SCNC: 23 MMOL/L — SIGNIFICANT CHANGE UP (ref 17–32)
CREAT SERPL-MCNC: 1 MG/DL — SIGNIFICANT CHANGE UP (ref 0.7–1.5)
GLUCOSE SERPL-MCNC: 109 MG/DL — HIGH (ref 70–99)
HCT VFR BLD CALC: 36.2 % — LOW (ref 42–52)
HGB BLD-MCNC: 12.5 G/DL — LOW (ref 14–18)
MCHC RBC-ENTMCNC: 30.6 PG — SIGNIFICANT CHANGE UP (ref 27–31)
MCHC RBC-ENTMCNC: 34.5 G/DL — SIGNIFICANT CHANGE UP (ref 32–37)
MCV RBC AUTO: 88.7 FL — SIGNIFICANT CHANGE UP (ref 80–94)
NRBC # BLD: 0 /100 WBCS — SIGNIFICANT CHANGE UP (ref 0–0)
PLATELET # BLD AUTO: 239 K/UL — SIGNIFICANT CHANGE UP (ref 130–400)
POTASSIUM SERPL-MCNC: 4.1 MMOL/L — SIGNIFICANT CHANGE UP (ref 3.5–5)
POTASSIUM SERPL-SCNC: 4.1 MMOL/L — SIGNIFICANT CHANGE UP (ref 3.5–5)
RBC # BLD: 4.08 M/UL — LOW (ref 4.7–6.1)
RBC # FLD: 12.3 % — SIGNIFICANT CHANGE UP (ref 11.5–14.5)
SODIUM SERPL-SCNC: 142 MMOL/L — SIGNIFICANT CHANGE UP (ref 135–146)
WBC # BLD: 10.24 K/UL — SIGNIFICANT CHANGE UP (ref 4.8–10.8)
WBC # FLD AUTO: 10.24 K/UL — SIGNIFICANT CHANGE UP (ref 4.8–10.8)

## 2018-04-17 RX ORDER — PANTOPRAZOLE SODIUM 20 MG/1
40 TABLET, DELAYED RELEASE ORAL ONCE
Qty: 0 | Refills: 0 | Status: COMPLETED | OUTPATIENT
Start: 2018-04-17 | End: 2018-04-17

## 2018-04-17 RX ORDER — PANTOPRAZOLE SODIUM 20 MG/1
40 TABLET, DELAYED RELEASE ORAL
Qty: 0 | Refills: 0 | Status: DISCONTINUED | OUTPATIENT
Start: 2018-04-17 | End: 2018-04-18

## 2018-04-17 RX ADMIN — SODIUM CHLORIDE 100 MILLILITER(S): 9 INJECTION, SOLUTION INTRAVENOUS at 10:45

## 2018-04-17 RX ADMIN — HEPARIN SODIUM 5000 UNIT(S): 5000 INJECTION INTRAVENOUS; SUBCUTANEOUS at 05:37

## 2018-04-17 RX ADMIN — Medication 1 MILLIGRAM(S): at 10:43

## 2018-04-17 RX ADMIN — PANTOPRAZOLE SODIUM 40 MILLIGRAM(S): 20 TABLET, DELAYED RELEASE ORAL at 18:22

## 2018-04-17 RX ADMIN — HEPARIN SODIUM 5000 UNIT(S): 5000 INJECTION INTRAVENOUS; SUBCUTANEOUS at 22:04

## 2018-04-17 RX ADMIN — Medication 1 MILLIGRAM(S): at 22:07

## 2018-04-17 RX ADMIN — HEPARIN SODIUM 5000 UNIT(S): 5000 INJECTION INTRAVENOUS; SUBCUTANEOUS at 15:09

## 2018-04-17 NOTE — CONSULT NOTE ADULT - SUBJECTIVE AND OBJECTIVE BOX
42 Y male with opiate use disorder and concurrent xanax use admitted to the ICU s/p overdose on heroin. Patient was intubated for 1 day on ICU, extubated 2 days ago. He is currently prescribed 5 tablets of 10-325mg percocet daily and xanax 1mg BID by his PMD (verified with istop). He ran out of oxycodone and used 1 bag of heroin intranasally to avoid withdrawal, then became sick and came to the ED. Presently patient is resting comfortably. He denies nausea and vomiting. he has mild anxiety but no visible agitation. He denies tactile disturbances, visual disturbances but has increased sensitivity to sound. He is oriented x 3. CIWA score is 2, COWS score is 0. He denies SI. He reports that he was in [facility] a few years ago in the [facility] for an overdose but doesnt recall his psych symptoms. He does not want to go to rehab presently but wants the referral info.     Vitals:   T: 98 HR: 50 rr:16 BP: 136/66    Labs  04-16    148<H>  |  103  |  12  ----------------------------<  118<H>  3.6   |  24  |  1.0    Ca    9.0      16 Apr 2018 04:26  Mg     1.8     04-16    TPro  6.7  /  Alb  4.3  /  TBili  0.4  /  DBili  x   /  AST  22  /  ALT  45<H>  /  AlkPhos  157<H>  04-16 04-16                          13.2   12.45 )-----------( 301      ( 16 Apr 2018 04:26 )             38.3     CT head; no acute pathology  EKG     Meds: protonix, prn xanax 1mg BID, heparin subc    Exam   General; no acute distress. AAO x 3.   Skin; no sweating visible or goosflesh  HEENT: pupils 3mm approximately. no runny nose  Cardio: bradycardia  musculoskeletal: no tremor, no psychomotor agitation  neuro: no aphasia  Psych: no acute psychosis, depression or yasmani. mild anxiety.     Mental status:  Affect is irritable, mood is irritable, speech is normal rate and volume, language has no aphasic difficulties, insight is fair, judgement is fair, thought process is linear, thought content lacks SI.     Assessment; 42 Y male with opiate use disorder and benzo use disorder admitted to medical service s/p accidental overdose, was intubated for 1 day and extubated 2 days ago. Patient is not in acute withdrawal, his COWS score is 0 and his CIWA score is 2. Patient does not want to go to rehab directly from here but will take a referral. He denies that this was a purposeful suicide attempt. He does not appear psychotic, manic or acutely depressed.     Plan:  1. give referral to central intake: [phone]  2. consider contacting Dr. Braden Nichole, PMD who prescribes xanax and oxycodone to let him know that patient overdosed on heroin. Istop report put in chart. 42 Y male with opiate use disorder and concurrent xanax use admitted to the ICU s/p overdose on heroin. Patient was intubated for 1 day on ICU, extubated 2 days ago. He is currently prescribed 5 tablets of 10-325mg percocet daily and xanax 1mg BID by his PMD (verified with istop). He ran out of oxycodone and used 1 bag of heroin intranasally to avoid withdrawal, then became sick and came to the ED. Presently patient is resting comfortably. He denies nausea and vomiting. he has mild anxiety but no visible agitation. He denies tactile disturbances, visual disturbances but has increased sensitivity to sound. He is oriented x 3. CIWA score is 2, COWS score is 0. He denies SI. He reports that he was in [facility] a few years ago in the C[facility] for an overdose but doesnt recall his psych symptoms. He does not want to go to rehab presently but wants the referral info.     Vitals:   T: 98 HR: 50 rr:16 BP: 136/66    Labs  04-16    148<H>  |  103  |  12  ----------------------------<  118<H>  3.6   |  24  |  1.0    Ca    9.0      16 Apr 2018 04:26  Mg     1.8     04-16    TPro  6.7  /  Alb  4.3  /  TBili  0.4  /  DBili  x   /  AST  22  /  ALT  45<H>  /  AlkPhos  157<H>  04-16 04-16                          13.2   12.45 )-----------( 301      ( 16 Apr 2018 04:26 )             38.3     CT head; no acute pathology  EKG     Meds: protonix, prn xanax 1mg BID, heparin subc    Exam   General; no acute distress. AAO x 3.   Skin; no sweating visible or goosflesh  HEENT: pupils 3mm approximately. no runny nose  Cardio: bradycardia  musculoskeletal: no tremor, no psychomotor agitation  neuro: no aphasia  Psych: no acute psychosis, depression or yasmani. mild anxiety.     Mental status:  Affect is irritable, mood is irritable, speech is normal rate and volume, language has no aphasic difficulties, insight is fair, judgement is fair, thought process is linear, thought content lacks SI.

## 2018-04-17 NOTE — CONSULT NOTE ADULT - ASSESSMENT
1-  Confusion with active vomiting that has now resolved.    This is unlikely soley opioid withdrawal as I would not expect confusion/delirium to be part of this syndrome unless it was iatrogenic.  He never received naloxone so this is unlikely.  It is possible that the patient insufflated buprenorpine at some point which could precipitate abrupt opioid withdrawal and potentially delirium, but this also seems unlikely.      Benzodiazepine/sedative hypnotic withdrawal with the delirium would have made more sense, but the patient did not state that he suddenly discontinued this.  In addition, the excessive GI symptoms and SOB seem unusual.      At this point, the patient has improved and is back to baseline.  We discussed the dangers of using any and all substances which are not prescribed by his own physicians.  He agrees and wants detox and help to D/C substance abuse.    Stable and cleared from med tox standpoint for psych and detox eval and disposition.
IMPRESSION:    AHRF secondary to AMS, improved  ? OD    PLAN:    CNS: Spontaneous awakening trial. Repeat CTH    HEENT: Oral care    PULMONARY:  HOB @ 45 degrees.  Vent changes as follows:  Wean O2    CARDIOVASCULAR: . Echo     GI: GI prophylaxis.  NPO For now    RENAL:  Follow up lytes.  Correct as needed    INFECTIOUS DISEASE: Follow up cultures.  Hold ABX for now    HEMATOLOGICAL:  DVT prophylaxis.    ENDOCRINE:  Follow up FS.  Insulin protocol if needed.    MUSCULOSKELETAL:    UDS SDS
Assessment; 42 Y male with opiate use disorder and benzo use disorder admitted to medical service s/p accidental overdose, was intubated for 1 day and extubated 2 days ago. Patient is not in acute withdrawal, his COWS score is 0 and his CIWA score is 2. Patient does not want to go to rehab directly from here but will take a referral. He denies that this was a purposeful suicide attempt. He does not appear psychotic, manic or acutely depressed.     Plan:  1. give referral to central intake: [phone]  2. consider contacting Dr. Braden Nichole, PMD who prescribes xanax and oxycodone to let him know that patient overdosed on heroin. Istop report put in chart.

## 2018-04-17 NOTE — CONSULT NOTE ADULT - SUBJECTIVE AND OBJECTIVE BOX
Time:04-17-18 @ 22:52  [facility] [id number] Neuro 005 A  Emergent/Routine    Chief Complaint:  Overdose    History of Present Illness:  42yMale presented to ED with confusion and vomiting and difficulty with breathing that required his airway to be secured with intubation and mechanical ventilation.  Patient states that he was recently being weaned from his "Percocet" and because he ran out of his opioids, he decided to start using heroin.  The day of arrival, he insufflated a few bags about 6 hours apart.  He then was in an argument with his cousin which made him angry and he is unsure of what happened after that.        Past Medical History:  ALTERED MENTAL STATUS ...  ALTERED MENTAL STATUS  ^UNRESPONSIVE  Anxiety  Chronic pain syndrome  Traumatic brain injury, without loss of consciousness, subsequent encounter  Altered mental status  H/O abdominal surgery  UNRESPONSIVE        Home Medications:  ALPRAZOLAM 1 MG TABS:  (15 Apr 2018 00:27)  LOSARTAN POTASSIUM 100 MG TABS:  (15 Apr 2018 00:27)  OXYCOD/APAP  TAB 10-325MG:  (15 Apr 2018 00:27)  Percocet 5/325:  (14 Apr 2018 19:10)  SUMATRIPTAN SUCCINATE 100 MG TABS:  (15 Apr 2018 00:27)  TESTOSTERONE CYPIONATE 200 MG/ML SOLN:  (15 Apr 2018 00:27)      Active Medications:  MEDICATIONS  (STANDING):  heparin  Injectable 5000 Unit(s) SubCutaneous every 8 hours  pantoprazole    Tablet 40 milliGRAM(s) Oral before breakfast    MEDICATIONS  (PRN):  ALPRAZolam 1 milliGRAM(s) Oral two times a day PRN anxiety        Social History:  Tobacco:   (+)  EtOH:   (+)  Illicit Substances:   (+)    Family History:   N/A    Review of Systems:  GENERAL: Denies fever/chills, loss of appetite/weight or fatigue  SKIN: Brittney rashes, abrasions, lacerations, ecchymosis, erythema, or edema.  HEAD: Denies headache, dizziness or trauma  EYES: Denies blurry vision, diplopia, or photophobia  ENT: Denies earaches, discharge or hearing loss. Denies nasal discharge or epistaxis. Denies sore throat.   CARDIAC: Denies chest pain, palpitations, or SOB.   RESPIRATORY: Denies SOB, cough, hemoptysis or wheezing.   GI: Denies abdominal pain, n/v/d, bloody stools or melena.   : Denies vaginal bleeding/discharge or pelvic pain. Denies testicular/penile pain or discharge. Denies hematuria, dysuria or frequency.   MSK: Denies myalgias, bony deformity or pain.   NEURO: Denies numbness, tingling, weakness.    All other systems negative or non-contributory    Physical Exam:  Vital Signs Last 24 Hrs  T(C): 36.9 (17 Apr 2018 22:13), Max: 37.2 (17 Apr 2018 00:00)  T(F): 98.5 (17 Apr 2018 22:13), Max: 98.9 (17 Apr 2018 00:00)  HR: 49 (17 Apr 2018 22:13) (48 - 60)  BP: 162/77 (17 Apr 2018 22:13) (130/60 - 162/77)  BP(mean): 109 (17 Apr 2018 18:00) (80 - 129)  RR: 20 (17 Apr 2018 22:13) (16 - 29)  SpO2: 98% (17 Apr 2018 18:00) (96% - 99%)  CAPILLARY BLOOD GLUCOSE          VITAL SIGNS: I have reviewed nursing notes and confirm.  CONSTITUTIONAL: Well-developed; well-nourished; in mild acute distress.  SKIN: Skin exam is warm and dry, no acute rash.  HEAD: Normocephalic; atraumatic.  EYES: PERRL, EOM intact; conjunctiva and sclera clear.  ENT: No nasal discharge; airway clear. TMs clear.  NECK: Supple; non tender.  CARD: S1, S2 normal; no murmurs, gallops, or rubs. Regular rate and rhythm.  RESP: No wheezes, rales or rhonchi.  ABD: Normal bowel sounds; soft; non-distended; non-tender; no hepatosplenomegaly.  EXT: Normal ROM. No clubbing, cyanosis or edema.  LYMPH: No acute cervical adenopathy.  NEURO: Alert, oriented. Grossly unremarkable. No focal deficits. (+) tremor  PSYCH: Cooperative, appropriate.      GCS:  E:   4/4  V:   5/5  M:   6/6    EKG:  Labs:                        12.5   10.24 )-----------( 239      ( 17 Apr 2018 12:01 )             36.2     04-17    142  |  102  |  10  ----------------------------<  109<H>  4.1   |  23  |  1.0    Ca    9.1      17 Apr 2018 12:01  Mg     1.8     04-16    TPro  6.7  /  Alb  4.3  /  TBili  0.4  /  DBili  x   /  AST  22  /  ALT  45<H>  /  AlkPhos  157<H>  04-16    PT/INR - ( 16 Apr 2018 04:26 )   PT: 12.20 sec;   INR: 1.13 ratio         PTT - ( 16 Apr 2018 04:26 )  PTT:34.5 sec          Aspirin:    Acetaminophen:    Ethanol:

## 2018-04-17 NOTE — CHART NOTE - NSCHARTNOTEFT_GEN_A_CORE
ICU DOWNGRADE NOTE:    42y Male transferred to floor from ICU    Patient is a 42y old Male who presents with a chief complaint of confusion (15 Apr 2018 02:09)    The patient is currently admitted for the primary diagnosis of Altered mental status    The patient was admitted to the unit for 2 Days.    The patient was intubated for 1 day and was never on pressors.    Indwelling vascular catheters: none    Urinary Catheter: none    Disposition: stable    Code Status: full    ICU COURSE OF EVENTS: 42 year old man with history of TBI , chronic pain on percocet , anxiety brought by EMS for vomiting , diarrhea and confusion, CTH neg,  intubated for airway protection and admitted to the ICU for monitoring. He was found to have an elevated AG , UDS positive for opiates and benzos. Pt was extubated on ICU day 2. At that time he admitted to ingesting heroin earlier that day. He was also found to have enteritis on CT abdomen, suspected viral and treated symptomatically, improved. He was stable post-extubation. He was requesting detox / rehab for his drug addiction.   -------------------------------------------------------------------------------------------        -------------------------------------------------------------------------------------------    Current workup in progress: f/u labs and addiction medicine c/s

## 2018-04-17 NOTE — CONSULT NOTE ADULT - ATTENDING COMMENTS
--Please call with any further questions    Criss    [phone] (pager)
41yo man with opioid use disorder (patient is treated with percocet for pain, but overuses the medication and then uses heroin intranasally to substitute) who was admitted following an accidental overdose on intranasal heroin.  The patient was intubated for one day, effectively receiving a rapid taper from fentanyl drip.  He is not currently in withdrawal.  He is interested in future substance abuse treatment but is on chronic opioid treatment for pain.  His best treatment would likely be suboxone maintenance dosed twice daily to aid with pain control.  -- Ancillary Withdrawal program [phone] with [phone] if first number does not reach the service  -- no need for detox at this time

## 2018-04-18 VITALS
HEART RATE: 52 BPM | DIASTOLIC BLOOD PRESSURE: 77 MMHG | TEMPERATURE: 99 F | RESPIRATION RATE: 20 BRPM | SYSTOLIC BLOOD PRESSURE: 164 MMHG

## 2018-04-18 LAB
ANION GAP SERPL CALC-SCNC: 16 MMOL/L — HIGH (ref 7–14)
BUN SERPL-MCNC: 9 MG/DL — LOW (ref 10–20)
CALCIUM SERPL-MCNC: 8.7 MG/DL — SIGNIFICANT CHANGE UP (ref 8.5–10.1)
CHLORIDE SERPL-SCNC: 103 MMOL/L — SIGNIFICANT CHANGE UP (ref 98–110)
CO2 SERPL-SCNC: 21 MMOL/L — SIGNIFICANT CHANGE UP (ref 17–32)
CREAT SERPL-MCNC: 0.9 MG/DL — SIGNIFICANT CHANGE UP (ref 0.7–1.5)
GLUCOSE SERPL-MCNC: 104 MG/DL — HIGH (ref 70–99)
HCT VFR BLD CALC: 35.9 % — LOW (ref 42–52)
HGB BLD-MCNC: 12.4 G/DL — LOW (ref 14–18)
MCHC RBC-ENTMCNC: 30.5 PG — SIGNIFICANT CHANGE UP (ref 27–31)
MCHC RBC-ENTMCNC: 34.5 G/DL — SIGNIFICANT CHANGE UP (ref 32–37)
MCV RBC AUTO: 88.2 FL — SIGNIFICANT CHANGE UP (ref 80–94)
NRBC # BLD: 0 /100 WBCS — SIGNIFICANT CHANGE UP (ref 0–0)
PLATELET # BLD AUTO: 248 K/UL — SIGNIFICANT CHANGE UP (ref 130–400)
POTASSIUM SERPL-MCNC: 3.1 MMOL/L — LOW (ref 3.5–5)
POTASSIUM SERPL-SCNC: 3.1 MMOL/L — LOW (ref 3.5–5)
RBC # BLD: 4.07 M/UL — LOW (ref 4.7–6.1)
RBC # FLD: 12.1 % — SIGNIFICANT CHANGE UP (ref 11.5–14.5)
SODIUM SERPL-SCNC: 140 MMOL/L — SIGNIFICANT CHANGE UP (ref 135–146)
WBC # BLD: 8.91 K/UL — SIGNIFICANT CHANGE UP (ref 4.8–10.8)
WBC # FLD AUTO: 8.91 K/UL — SIGNIFICANT CHANGE UP (ref 4.8–10.8)

## 2018-04-18 RX ORDER — ALPRAZOLAM 0.25 MG
1 TABLET ORAL
Qty: 60 | Refills: 0 | COMMUNITY

## 2018-04-18 RX ORDER — POTASSIUM CHLORIDE 20 MEQ
40 PACKET (EA) ORAL ONCE
Qty: 0 | Refills: 0 | Status: COMPLETED | OUTPATIENT
Start: 2018-04-18 | End: 2018-04-18

## 2018-04-18 RX ORDER — SUMATRIPTAN SUCCINATE 4 MG/.5ML
0 INJECTION, SOLUTION SUBCUTANEOUS
Qty: 9 | Refills: 0 | COMMUNITY

## 2018-04-18 RX ORDER — ALPRAZOLAM 0.25 MG
0 TABLET ORAL
Qty: 60 | Refills: 0 | COMMUNITY

## 2018-04-18 RX ORDER — LOSARTAN POTASSIUM 100 MG/1
0 TABLET, FILM COATED ORAL
Qty: 90 | Refills: 0 | COMMUNITY

## 2018-04-18 RX ADMIN — Medication 40 MILLIEQUIVALENT(S): at 13:18

## 2018-04-18 RX ADMIN — Medication 1 MILLIGRAM(S): at 08:29

## 2018-04-18 RX ADMIN — HEPARIN SODIUM 5000 UNIT(S): 5000 INJECTION INTRAVENOUS; SUBCUTANEOUS at 05:49

## 2018-04-18 RX ADMIN — PANTOPRAZOLE SODIUM 40 MILLIGRAM(S): 20 TABLET, DELAYED RELEASE ORAL at 05:49

## 2018-04-18 NOTE — DISCHARGE NOTE ADULT - CARE PLAN
Principal Discharge DX:	Other coma depth, at arrival to emergency department  Goal:	Prevent future episodes  Assessment and plan of treatment:	Refrain from the usage of opoids and benzodiazapines and follow up with toxicology.  Also follow up with pmd in 1-2 weeks.  Recommend detox at [address] [place] [address], [phone]  Secondary Diagnosis:	Anxiety  Goal:	Prevent future episodes  Assessment and plan of treatment:	Follow up with primary care doctor in 1-2 weeks.  Secondary Diagnosis:	Traumatic brain injury, without loss of consciousness, subsequent encounter  Goal:	Prevent worsening of condition  Assessment and plan of treatment:	Follow up with primary care doctor for follow up in 1-2 weeks. Principal Discharge DX:	Other coma depth, at arrival to emergency department  Goal:	Prevent future episodes  Assessment and plan of treatment:	Refrain from the usage of opioids and illicit drugs.  Also follow up with pmd in 1-2 weeks.  Recommend detox at [address] [place] [address], [phone] or [facility] . Consider neurology follow up at   Secondary Diagnosis:	Anxiety  Goal:	Prevent future episodes  Assessment and plan of treatment:	Follow up with primary care doctor in 1-2 weeks.  Secondary Diagnosis:	Traumatic brain injury, without loss of consciousness, subsequent encounter  Goal:	Prevent worsening of condition  Assessment and plan of treatment:	Follow up with primary care doctor for follow up in 1-2 weeks.

## 2018-04-18 NOTE — DISCHARGE NOTE ADULT - MEDICATION SUMMARY - MEDICATIONS TO TAKE
I will START or STAY ON the medications listed below when I get home from the hospital:    SUMATRIPTAN SUCCINATE 100 MG TABS  -- Indication: For Traumatic brain injury, without loss of consciousness, subsequent encounter    LOSARTAN POTASSIUM 100 MG TABS  -- Indication: For High blood pressure    ALPRAZOLAM 1 MG TABS  -- Indication: For Anxiety I will START or STAY ON the medications listed below when I get home from the hospital:    LOSARTAN POTASSIUM 100 MG TABS  -- Indication: For High blood pressure    ALPRAZOLAM 1 MG TABS  -- 1 tab(s) by mouth 2 times a day, As Needed  -- Indication: For Anxiety

## 2018-04-18 NOTE — DISCHARGE NOTE ADULT - PLAN OF CARE
Prevent future episodes Refrain from the usage of opoids and benzodiazapines and follow up with toxicology.  Also follow up with pmd in 1-2 weeks.  Recommend detox at [address] [place] [address], [phone] Follow up with primary care doctor in 1-2 weeks. Prevent worsening of condition Follow up with primary care doctor for follow up in 1-2 weeks. Refrain from the usage of opioids and illicit drugs.  Also follow up with pmd in 1-2 weeks.  Recommend detox at [address] [place] [address], [phone] or [facility] . Consider neurology follow up at

## 2018-04-18 NOTE — DISCHARGE NOTE ADULT - ADDITIONAL INSTRUCTIONS
Imperative to follow up with pmd for evaluation of medications and follow up for symptoms. Imperative to follow up with pmd for evaluation of medications and follow up for symptoms.  Abstain from illicit drugs

## 2018-04-18 NOTE — DISCHARGE NOTE ADULT - HOSPITAL COURSE
43 y/o male presented to the ER with an altered mental status after usage of heroin at home.  Pt reports that he has recently been weaning off opoid medication usage and when he ran out of opoid medication recently used heroin to prevent withdrawal.  However patient became lethargic and unresponsive and home and was brought in by family.  During his stay in the hospital patient became intubated for airway protection and decreased raspatory drive.  Patient eventually was extubated and tolerated extubation well.  Downgraded to medical floor for further monitoring with EEG.  EEG for past history of seizures as per patient was done with no conclusive findings.  Patient will be discharged with recommendations for follow up outpatient with neurology and detox clinic if he chooses to go. 41 y/o male presented to the ER with an altered mental status after usage of heroin at home.  Pt reports that he has recently been weaning off opoid medication usage and when he ran out of opoid medication recently used heroin to prevent withdrawal.  However patient became lethargic and unresponsive and home and was brought in by family.  During his stay in the hospital patient became intubated for airway protection and decreased raspatory drive.  Patient eventually was extubated and tolerated extubation well.  Downgraded to medical floor.  EEG unremarkable.  Patient will be discharged with recommendations for follow up outpatient with neurology and detox clinic if he chooses to go.

## 2018-04-18 NOTE — DISCHARGE NOTE ADULT - CARE PROVIDER_API CALL
rBaden Nichole), Medicine  [address] [place] [address]  Phone: [phone]  Fax: [phone]    Jesse Baltazar), Neurology; Neuromuscular Medicine  [facility] [id number]  Phone: [phone]  Fax: [phone]

## 2018-04-18 NOTE — PROGRESS NOTE ADULT - SUBJECTIVE AND OBJECTIVE BOX
Patient is a 42y old  Male who presents with a chief complaint of confusion (18 Apr 2018 15:17)      INTERVAL HPI/OVERNIGHT EVENTS: none    HPI:  42 year old man with history of traumatic brain injury, anxiety, chronic pain brought by ems after his friends called because of his confusion. He was apparently playing video games at his friends house when all of a sudden he started having chills and vomiting and having stool incontinence on himself and then he became confused. His friends denied drug ingestion. He received narcan on the field but he was still confused. The patient was confused upon arrival to the ED. He was still vomiting in the ED. He was agitated so they had to perform endotracheal intubation. (15 Apr 2018 02:09)      PAST MEDICAL & SURGICAL HISTORY:    MEDICATIONS  (STANDING):  heparin  Injectable 5000 Unit(s) SubCutaneous every 8 hours  pantoprazole    Tablet 40 milliGRAM(s) Oral before breakfast    MEDICATIONS  (PRN):  ALPRAZolam 1 milliGRAM(s) Oral two times a day PRN anxiety    Home Medications:  ALPRAZOLAM 1 MG TABS: 1 tab(s) orally 2 times a day, As Needed (18 Apr 2018 15:41)  LOSARTAN POTASSIUM 100 MG TABS:  (15 Apr 2018 00:27)      Vital Signs Last 24 Hrs  T(C): 37.1 (18 Apr 2018 07:27), Max: 37.5 (17 Apr 2018 23:32)  T(F): 98.7 (18 Apr 2018 07:27), Max: 99.5 (17 Apr 2018 23:32)  HR: 48 (18 Apr 2018 07:27) (48 - 60)  BP: 136/70 (18 Apr 2018 07:27) (136/70 - 162/77)  BP(mean): 109 (17 Apr 2018 18:00) (109 - 120)  RR: 18 (18 Apr 2018 07:27) (18 - 29)  SpO2: 98% (17 Apr 2018 18:00) (98% - 98%)  CAPILLARY BLOOD GLUCOSE          General: NAD, AAO3  CV: S1 S2  Pulm: decreased breath sounds at base  Abd: NT/ND/S +BS  EXT: no clubbing/cyanosis/edema  Neuro: nonfocal    LABS:                        12.4   8.91  )-----------( 248      ( 18 Apr 2018 07:26 )             35.9     04-18    140  |  103  |  9<L>  ----------------------------<  104<H>  3.1<L>   |  21  |  0.9    Ca    8.7      18 Apr 2018 07:26                            Consultant(s) Notes Reviewed:  [x ] YES  [ ] NO    Care Discussed with Consultants/Other Providers/ Housestaff [ x] YES  [ ] NO    Radiology personally reviewed.

## 2018-04-18 NOTE — DISCHARGE NOTE ADULT - MEDICATION SUMMARY - MEDICATIONS TO STOP TAKING
I will STOP taking the medications listed below when I get home from the hospital:  None I will STOP taking the medications listed below when I get home from the hospital:    Percocet 5/325    SUMATRIPTAN SUCCINATE 100 MG TABS    OXYCOD/APAP  TAB 10-325MG

## 2018-04-18 NOTE — DISCHARGE NOTE ADULT - CARE PROVIDERS DIRECT ADDRESSES
,DirectAddress_Unknown,[email or web address]

## 2018-04-18 NOTE — PROGRESS NOTE ADULT - ASSESSMENT
43yo man with opioid use disorder (patient is treated with percocet for pain, but overuses the medication and then uses heroin intranasally to substitute) who was admitted following ?an accidental overdose on intranasal heroin. No complaints. Exam unremarkable.    -ARF - resolved  -Suspected OD - pt counselled extensively to abstain from illicit drugs. Referral given for outpt detox.  -HTN - cont ARB    Pt verbalized understanding of instructions.

## 2018-04-18 NOTE — DISCHARGE NOTE ADULT - PATIENT PORTAL LINK FT
You can access the [facility] Portal, offered by [facility], by registering with the following website: [email or web address]

## 2018-04-20 LAB
CULTURE RESULTS: SIGNIFICANT CHANGE UP
SPECIMEN SOURCE: SIGNIFICANT CHANGE UP

## 2018-04-25 DIAGNOSIS — T40.1X1A POISONING BY HEROIN, ACCIDENTAL (UNINTENTIONAL), INITIAL ENCOUNTER: ICD-10-CM

## 2018-04-25 DIAGNOSIS — N17.9 ACUTE KIDNEY FAILURE, UNSPECIFIED: ICD-10-CM

## 2018-04-25 DIAGNOSIS — R41.82 ALTERED MENTAL STATUS, UNSPECIFIED: ICD-10-CM

## 2018-04-25 DIAGNOSIS — R19.7 DIARRHEA, UNSPECIFIED: ICD-10-CM

## 2018-04-25 DIAGNOSIS — Z87.820 PERSONAL HISTORY OF TRAUMATIC BRAIN INJURY: ICD-10-CM

## 2018-04-25 DIAGNOSIS — J96.02 ACUTE RESPIRATORY FAILURE WITH HYPERCAPNIA: ICD-10-CM

## 2018-04-25 DIAGNOSIS — E86.0 DEHYDRATION: ICD-10-CM

## 2018-04-25 DIAGNOSIS — R74.0 NONSPECIFIC ELEVATION OF LEVELS OF TRANSAMINASE AND LACTIC ACID DEHYDROGENASE [LDH]: ICD-10-CM

## 2018-04-25 DIAGNOSIS — I10 ESSENTIAL (PRIMARY) HYPERTENSION: ICD-10-CM

## 2018-04-25 DIAGNOSIS — G89.4 CHRONIC PAIN SYNDROME: ICD-10-CM

## 2018-04-25 DIAGNOSIS — F17.210 NICOTINE DEPENDENCE, CIGARETTES, UNCOMPLICATED: ICD-10-CM
